# Patient Record
Sex: FEMALE | Race: BLACK OR AFRICAN AMERICAN | NOT HISPANIC OR LATINO | Employment: FULL TIME | URBAN - METROPOLITAN AREA
[De-identification: names, ages, dates, MRNs, and addresses within clinical notes are randomized per-mention and may not be internally consistent; named-entity substitution may affect disease eponyms.]

---

## 2020-09-28 ENCOUNTER — OFFICE VISIT (OUTPATIENT)
Dept: URGENT CARE | Facility: CLINIC | Age: 40
End: 2020-09-28
Payer: COMMERCIAL

## 2020-09-28 VITALS
WEIGHT: 215.06 LBS | BODY MASS INDEX: 30.11 KG/M2 | HEIGHT: 71 IN | DIASTOLIC BLOOD PRESSURE: 84 MMHG | HEART RATE: 85 BPM | SYSTOLIC BLOOD PRESSURE: 133 MMHG | TEMPERATURE: 98 F | OXYGEN SATURATION: 100 %

## 2020-09-28 DIAGNOSIS — F41.0 PANIC ATTACK DUE TO EXCEPTIONAL STRESS: Primary | ICD-10-CM

## 2020-09-28 DIAGNOSIS — F43.0 PANIC ATTACK DUE TO EXCEPTIONAL STRESS: Primary | ICD-10-CM

## 2020-09-28 PROCEDURE — 99203 PR OFFICE/OUTPT VISIT, NEW, LEVL III, 30-44 MIN: ICD-10-PCS | Mod: S$GLB,,, | Performed by: INTERNAL MEDICINE

## 2020-09-28 PROCEDURE — 99203 OFFICE O/P NEW LOW 30 MIN: CPT | Mod: S$GLB,,, | Performed by: INTERNAL MEDICINE

## 2020-09-28 RX ORDER — ALPRAZOLAM 0.5 MG/1
0.5 TABLET ORAL 2 TIMES DAILY PRN
COMMUNITY
End: 2020-09-28

## 2020-09-28 RX ORDER — ALPRAZOLAM 0.5 MG/1
0.5 TABLET ORAL 2 TIMES DAILY PRN
Qty: 10 TABLET | Refills: 0 | Status: SHIPPED | OUTPATIENT
Start: 2020-09-28 | End: 2020-10-28

## 2020-09-28 RX ORDER — ZOLPIDEM TARTRATE 12.5 MG/1
12.5 TABLET, FILM COATED, EXTENDED RELEASE ORAL NIGHTLY PRN
COMMUNITY

## 2020-09-28 NOTE — PROGRESS NOTES
CERTIFICATE OF SCHOOL      December 18, 2019      Re: Koffi Mayen  [id number]   [place] [address]      This is to certify that Koffi Mayen has been under my care from 12/16/2019 and can return to school.    RESTRICTIONS: none       REMARKS: eye exam at 2:20 12/16/2019        SIGNATURE:___________________________________________          Puneet Katz OD  [facility] [address] [facility] [id number]  Dept Phone: [phone]     "Subjective:       Patient ID: Lit Price is a 40 y.o. female.    Vitals:  height is 5' 11" (1.803 m) and weight is 97.6 kg (215 lb 0.9 oz). Her temperature is 98.3 °F (36.8 °C). Her blood pressure is 133/84 and her pulse is 85. Her oxygen saturation is 100%.     Chief Complaint: Anxiety    Anxiety  Presents for initial visit. Onset was in the past 7 days (Saturday). The problem has been gradually worsening. Symptoms include decreased concentration, excessive worry, hyperventilation, insomnia, irritability, malaise, muscle tension, nervous/anxious behavior, panic and restlessness. Patient reports no chest pain, compulsions, confusion, depressed mood, dizziness, dry mouth, feeling of choking, impotence, nausea, obsessions, palpitations, shortness of breath or suicidal ideas. Symptoms occur constantly. The severity of symptoms is causing significant distress. The symptoms are aggravated by work stress. The patient sleeps 4 hours per night. The quality of sleep is poor. Nighttime awakenings: one to two.     Risk factors include prior traumatic experience. Her past medical history is significant for anemia and anxiety/panic attacks. There is no history of arrhythmia, asthma, bipolar disorder, CAD, CHF, chronic lung disease, depression, fibromyalgia, hyperthyroidism or suicide attempts. Past treatments include benzodiazephines and SSRIs. The treatment provided moderate relief. Compliance with prior treatments has been good.       Constitution: Negative for chills, fatigue and fever.   HENT: Negative for congestion and sore throat.    Neck: Negative for painful lymph nodes.   Cardiovascular: Negative for chest pain, leg swelling and palpitations.   Eyes: Negative for double vision and blurred vision.   Respiratory: Positive for chest tightness. Negative for cough and shortness of breath.    Gastrointestinal: Negative for nausea, vomiting and diarrhea.   Genitourinary: Negative for dysuria, frequency, urgency and history " of kidney stones.   Musculoskeletal: Negative for joint pain, joint swelling, muscle cramps and muscle ache.   Skin: Negative for color change, pale, rash, erythema and bruising.   Allergic/Immunologic: Negative for seasonal allergies.   Neurological: Negative for dizziness, history of vertigo, light-headedness, passing out and headaches.   Hematologic/Lymphatic: Negative for swollen lymph nodes.   Psychiatric/Behavioral: Positive for agitation, nervous/anxious and sleep disturbance. Negative for confusion, suicidal ideas and depression. The patient is nervous/anxious and has insomnia.        Objective:      Physical Exam   Constitutional: She is oriented to person, place, and time. She appears well-developed. No distress.   HENT:   Head: Normocephalic and atraumatic.   Ears:   Right Ear: External ear normal.   Left Ear: External ear normal.   Nose: Nose normal.   Mouth/Throat: Oropharynx is clear and moist. No oropharyngeal exudate.   Eyes: Pupils are equal, round, and reactive to light. Conjunctivae and EOM are normal. Right eye exhibits no discharge. Left eye exhibits no discharge. No scleral icterus.   Neck: Normal range of motion. Neck supple.   Cardiovascular: Normal rate.   Pulmonary/Chest: Effort normal. No respiratory distress.   Lymphadenopathy:     She has no cervical adenopathy.   Neurological: She is alert and oriented to person, place, and time.   Skin: Skin is warm, dry, not diaphoretic and no rash. Capillary refill takes less than 2 seconds. erythemaPsychiatric: Her behavior is normal.   Nursing note and vitals reviewed.        Assessment:       1. Panic attack due to exceptional stress        Plan:         Panic attack due to exceptional stress  -     ALPRAZolam (XANAX) 0.5 MG tablet; Take 1 tablet (0.5 mg total) by mouth 2 (two) times daily as needed for Anxiety.  Dispense: 10 tablet; Refill: 0    Refilled 5 days of Xanax, encouraged patient to follow up with PCP for any further needs. No  physiologic evidence of withdrawal.

## 2020-09-28 NOTE — PATIENT INSTRUCTIONS
Treating Anxiety Disorders with Medicine  An anxiety disorder can make you feel nervous or apprehensive, even without a clear reason. In people age 65 and older, generalized anxiety disorder is one of the most commonly diagnosed anxiety disorders. Many times it occurs with depression. Certain anxiety disorders can cause intense feelings of fear or panic. You may even have physical symptoms such as a racing heartbeat, sweating, or dizziness. If you have these feelings, you dont have to suffer anymore. Treatment to help you overcome your fears will likely include therapy (also called counseling). Medicine may also be prescribed to help control your symptoms.    Medicines  Certain medicines may be prescribed to help control your symptoms. So you may feel less anxious. You may also feel able to move forward with therapy. At first, medicines and dosages may need to be adjusted to find what works best for you. Try to be patient. Tell your healthcare provider how a medicine makes you feel. This way, you can work together to find the treatment thats best for you. Keep in mind that medicines can have side effects. Talk with your provider about any side effects that are bothering you. Changing the dose or type of medicine may help. Dont stop taking medicine on your own. That can cause symptoms to come back.  · Anti-anxiety medicine. This medicine eases symptoms and helps you relax. Your healthcare provider will explain when and how to use it. It may be prescribed for use before situations that make you anxious. You may also be told to take medicine on a regular schedule. Anti-anxiety medicine may make you feel a little sleepy or out of it. Dont drive a car or operate machinery while on this medicine, until you know how it affects you.  Caution  Never use alcohol or other drugs with anti-anxiety medicines. This could result in loss of muscular control, sedation, coma, or death. Also, use only the amount of medicine  prescribed for you. If you think you may have taken too much, get emergency care right away.   · Antidepressant medicine. This kind of medicine is often used to treat anxiety, even if you arent depressed. An antidepressant helps balance out brain chemicals. This helps keep anxiety under control. This medicine is taken on a schedule. It takes a few weeks to start working. If you dont notice a change at first, you may just need more time. But if you dont notice results after the first few weeks, tell your provider.  Keep taking medicines as prescribed  Never change your dosage, share or use another person's medicine, or stop taking your medicines without talking to your healthcare provider first. Keep the following in mind:  · Some medicines must be taken on a schedule. Make this part of your daily routine. For instance, always take your pill before brushing your teeth. A pillbox can help you remember if youve taken your medicine each day.  · Medicines are often taken for 6 to 12 months. Your healthcare provider will then evaluate whether you need to stay on them. Many people who have also had therapy may no longer need medicine to manage anxiety.  · You may need to stop taking medicine slowly to give your body time to adjust. When its time to stop, your healthcare provider will tell you more. Remember: Never stop taking your medicine without talking to your provider first.  · If symptoms return, you may need to start taking medicines again. This isnt your fault. Its just the nature of your anxiety disorder.  Special concerns  · Side effects. Medicines may cause side effects. Ask your healthcare provider or pharmacist what you can expect. They may have ideas for avoiding some side effects.  · Sexual problems. Some antidepressants can affect your desire for sex or your ability to have an orgasm. A change in dosage or medicine often solves the problem. If you have a sexual side effect that concerns you, tell your  healthcare provider.  · Addiction. If youve never had a problem with drugs or alcohol, you may not have a problem with medicines used to treat anxiety disorders. But always discuss the medicines with your healthcare provider before taking them. If you have a history of addiction, you may not be able to use certain medicines used to treat anxiety disorders.  · Medicine interactions. Always check with your pharmacist before using any over-the-counter medicines, including herbal supplements.   Date Last Reviewed: 5/1/2017 © 2000-2017 [employer]. [address] [place] [address], [place] [address]. All rights reserved. This information is not intended as a substitute for professional medical care. Always follow your healthcare professional's instructions.

## 2020-12-22 ENCOUNTER — CLINICAL SUPPORT (OUTPATIENT)
Dept: URGENT CARE | Facility: CLINIC | Age: 40
End: 2020-12-22
Payer: COMMERCIAL

## 2020-12-22 DIAGNOSIS — Z03.818 ENCOUNTER FOR OBSERVATION FOR SUSPECTED EXPOSURE TO OTHER BIOLOGICAL AGENTS RULED OUT: Primary | ICD-10-CM

## 2020-12-22 LAB
CTP QC/QA: YES
SARS-COV-2 RDRP RESP QL NAA+PROBE: NEGATIVE

## 2020-12-22 PROCEDURE — U0002 COVID-19 LAB TEST NON-CDC: HCPCS | Mod: QW,S$GLB,, | Performed by: NURSE PRACTITIONER

## 2020-12-22 PROCEDURE — 99211 PR OFFICE/OUTPT VISIT, EST, LEVL I: ICD-10-PCS | Mod: S$GLB,,, | Performed by: NURSE PRACTITIONER

## 2020-12-22 PROCEDURE — 99211 OFF/OP EST MAY X REQ PHY/QHP: CPT | Mod: S$GLB,,, | Performed by: NURSE PRACTITIONER

## 2020-12-22 PROCEDURE — U0002: ICD-10-PCS | Mod: QW,S$GLB,, | Performed by: NURSE PRACTITIONER

## 2020-12-22 NOTE — PATIENT INSTRUCTIONS
"  You have tested negative for COVID-19 today.  If you did not have a close exposure (as defined below) you can return to your normal daily activities to include social distancing, wearing a mask and frequent handwashing.    A "close exposure" is defined as anyone who has had an exposure (masked or unmasked) to a known COVID -19 positive person within 6 ft for longer than 15 minutes. If your exposure meets this definition, you are required by CDC guidelines to quarantine for at least 7-10 days from time of exposure.    The CDC states that a test can be performed for an asymptomatic patient (someone who does not have any symptoms) after a close exposure, and that a test should be done if you develop symptoms after a close exposure as described above.    Specifically, you can test at day 5 or later if asymptomatic in order to get released from quarantine on day 7 or later.  If you develop symptoms sooner, you should test when your symptoms start.    If you developed symptoms since the exposure, and your test was negative today and less than 5 days from your exposure, you still have to quarantine for 7-10 days from the date of the exposure.  The 7-10 day quarantine begins from the day you were exposed, not the day of your test.  For example, if your exposure was on a Monday, and you waited until Friday of the same week to get tested and it was negative, your 7-10 day quarantine begins from that Monday, not the Friday you tested negative.    Please note, if you decide to test as an asymptomatic during your quarantine and you are positive, you will be restarting your quarantine and moving from a possible 10 day quarantine (if you do not test), to a 11 day or greater quarantine.    "

## 2022-07-14 ENCOUNTER — OFFICE VISIT (OUTPATIENT)
Dept: URGENT CARE | Facility: CLINIC | Age: 42
End: 2022-07-14
Payer: COMMERCIAL

## 2022-07-14 VITALS
SYSTOLIC BLOOD PRESSURE: 153 MMHG | HEIGHT: 71 IN | HEART RATE: 86 BPM | BODY MASS INDEX: 30.8 KG/M2 | TEMPERATURE: 99 F | OXYGEN SATURATION: 100 % | DIASTOLIC BLOOD PRESSURE: 92 MMHG | WEIGHT: 220 LBS | RESPIRATION RATE: 16 BRPM

## 2022-07-14 DIAGNOSIS — T78.40XA ALLERGIC REACTION, INITIAL ENCOUNTER: ICD-10-CM

## 2022-07-14 DIAGNOSIS — J06.9 URI WITH COUGH AND CONGESTION: Primary | ICD-10-CM

## 2022-07-14 LAB
CTP QC/QA: YES
CTP QC/QA: YES
MOLECULAR STREP A: NEGATIVE
SARS-COV-2 RDRP RESP QL NAA+PROBE: NEGATIVE

## 2022-07-14 PROCEDURE — 87651 STREP A DNA AMP PROBE: CPT | Mod: QW,S$GLB,, | Performed by: PHYSICIAN ASSISTANT

## 2022-07-14 PROCEDURE — U0002 COVID-19 LAB TEST NON-CDC: HCPCS | Mod: QW,S$GLB,, | Performed by: PHYSICIAN ASSISTANT

## 2022-07-14 PROCEDURE — 99214 PR OFFICE/OUTPT VISIT, EST, LEVL IV, 30-39 MIN: ICD-10-PCS | Mod: S$GLB,,, | Performed by: PHYSICIAN ASSISTANT

## 2022-07-14 PROCEDURE — 99214 OFFICE O/P EST MOD 30 MIN: CPT | Mod: S$GLB,,, | Performed by: PHYSICIAN ASSISTANT

## 2022-07-14 PROCEDURE — U0002: ICD-10-PCS | Mod: QW,S$GLB,, | Performed by: PHYSICIAN ASSISTANT

## 2022-07-14 PROCEDURE — 87651 POCT STREP A MOLECULAR: ICD-10-PCS | Mod: QW,S$GLB,, | Performed by: PHYSICIAN ASSISTANT

## 2022-07-14 RX ORDER — PROMETHAZINE HYDROCHLORIDE AND DEXTROMETHORPHAN HYDROBROMIDE 6.25; 15 MG/5ML; MG/5ML
5 SYRUP ORAL EVERY 6 HOURS PRN
Qty: 140 ML | Refills: 0 | Status: SHIPPED | OUTPATIENT
Start: 2022-07-14 | End: 2022-07-21

## 2022-07-14 RX ORDER — FLUTICASONE PROPIONATE 50 MCG
2 SPRAY, SUSPENSION (ML) NASAL DAILY
Qty: 9.9 ML | Refills: 0 | Status: SHIPPED | OUTPATIENT
Start: 2022-07-14 | End: 2022-07-21

## 2022-07-14 RX ORDER — METHYLPREDNISOLONE 4 MG/1
TABLET ORAL
Qty: 1 EACH | Refills: 0 | Status: SHIPPED | OUTPATIENT
Start: 2022-07-14

## 2022-07-14 NOTE — PATIENT INSTRUCTIONS
Take promethazine dm syrup for cough - may cause drowsiness. Sterile saline rinses and flonase to decongest. Tylenol or motrin for pain/fever. May try sudafed for any severe congestion. Rest and drink plenty of fluids.     May try medrol dose quin to help with congestion and possible lip swelling. Discontinue if blood pressure consistently > 160/90, sugar > 250 or severe side effects - may cause anxiety, insomnia, palpitations and fluid retention.      Follow up with your doctor for any persistent new fever, or persistent sinus pressure/congestion not improving > 10 days.You need to be seen urgently if you develop any chest pain or shortness of breath.     Lip swelling - monitor for any swelling of tongue, throat, shortness of breath, vomiting or severe hives - needs emergent evaluation. You may try benadryl every 4-6 hours if persists.

## 2022-07-14 NOTE — PROGRESS NOTES
"Subjective:       Patient ID: Lit Price is a 42 y.o. female.    Vitals:  height is 5' 11" (1.803 m) and weight is 99.8 kg (220 lb). Her temperature is 98.6 °F (37 °C). Her blood pressure is 153/92 (abnormal) and her pulse is 86. Her respiration is 16 and oxygen saturation is 100%.     Chief Complaint: Diarrhea    Pt c/o URI symptoms, sore throat, nasal congestion, chest congestion, sinus pressure, wet cough, lip swelling starting last Saturday - 5 days ago.  Pt states noticed lip swelling yesterday. No SOB, swelling of tongue or throat, NV or hives. Not on any home meds, unsure if she could have had a possible allergic rxn to some type of food. Hx of ultram allergy only.  Pt took at home covid test (Saturday/Tuesday) both negative. Pt also states occasional diarrhea.     Cough  This is a new problem. The current episode started in the past 7 days. The problem has been unchanged. The problem occurs every few minutes. The cough is productive of sputum (Wet cough). Associated symptoms include ear pain (pressure), nasal congestion, postnasal drip, a sore throat and sweats (night). Pertinent negatives include no chest pain, chills, ear congestion, eye redness, fever, headaches, heartburn, hemoptysis, myalgias, rash, rhinorrhea, shortness of breath, weight loss or wheezing. Nothing aggravates the symptoms. Treatments tried: zyrtec, mucinex DM. The treatment provided no relief. Her past medical history is significant for bronchitis. There is no history of asthma, bronchiectasis, COPD, emphysema, environmental allergies or pneumonia.       Constitution: Negative for chills and fever.   HENT: Positive for ear pain (pressure), congestion, postnasal drip and sore throat.    Neck: Negative for neck pain and neck stiffness.   Cardiovascular: Negative for chest pain, leg swelling, palpitations and sob on exertion.   Eyes: Negative for eye discharge, eye itching, eye pain and eye redness.   Respiratory: Positive for cough and " sputum production. Negative for bloody sputum, shortness of breath and wheezing.    Gastrointestinal: Positive for diarrhea. Negative for abdominal pain, nausea, vomiting and heartburn.   Genitourinary: Negative for dysuria.   Musculoskeletal: Negative for muscle ache.   Skin: Negative for rash and wound.   Allergic/Immunologic: Negative for environmental allergies.   Neurological: Negative for headaches.       Objective:      Physical Exam   Constitutional: She is oriented to person, place, and time. She appears well-developed. She is cooperative.  Non-toxic appearance. She does not appear ill. No distress.   HENT:   Head: Normocephalic and atraumatic.   Ears:   Right Ear: Hearing, tympanic membrane, external ear and ear canal normal.   Left Ear: Hearing, tympanic membrane, external ear and ear canal normal.   Nose: Mucosal edema, rhinorrhea and congestion present. No nasal deformity. No epistaxis. Right sinus exhibits no maxillary sinus tenderness and no frontal sinus tenderness. Left sinus exhibits no maxillary sinus tenderness and no frontal sinus tenderness.   Mouth/Throat: Uvula is midline and mucous membranes are normal. No trismus in the jaw. Normal dentition. No uvula swelling. Posterior oropharyngeal erythema and cobblestoning present. No oropharyngeal exudate or posterior oropharyngeal edema. Tonsils are 2+ on the right. Tonsils are 2+ on the left. No tonsillar exudate.   Mild upper and lower lip swelling. No intraoral or pharyngeal angioedema. No stridor. No hives      Comments: Mild upper and lower lip swelling. No intraoral or pharyngeal angioedema. No stridor. No hives  Eyes: Conjunctivae and lids are normal. No scleral icterus.   Neck: Trachea normal and phonation normal. Neck supple. No edema present. No erythema present. No neck rigidity present.   Cardiovascular: Normal rate, regular rhythm, normal heart sounds and normal pulses.   Pulmonary/Chest: Effort normal and breath sounds normal. No  respiratory distress. She has no decreased breath sounds. She has no wheezes. She has no rhonchi. She has no rales.   Abdominal: Normal appearance. There is no abdominal tenderness.   Musculoskeletal: Normal range of motion.         General: No deformity. Normal range of motion.   Neurological: She is alert and oriented to person, place, and time. She exhibits normal muscle tone. Coordination normal.   Skin: Skin is warm, dry, intact, not diaphoretic and not pale.   Psychiatric: Her speech is normal and behavior is normal. Judgment and thought content normal.   Nursing note and vitals reviewed.        Results for orders placed or performed in visit on 07/14/22   POCT COVID-19 Rapid Screening   Result Value Ref Range    POC Rapid COVID Negative Negative     Acceptable Yes    POCT Strep A, Molecular   Result Value Ref Range    Molecular Strep A, POC Negative Negative     Acceptable Yes        Assessment:       1. URI with cough and congestion    2. Allergic reaction, initial encounter          Plan:         URI with cough and congestion  -     POCT COVID-19 Rapid Screening  -     methylPREDNISolone (MEDROL DOSEPACK) 4 mg tablet; use as directed  Dispense: 1 each; Refill: 0  -     promethazine-dextromethorphan (PROMETHAZINE-DM) 6.25-15 mg/5 mL Syrp; Take 5 mLs by mouth every 6 (six) hours as needed.  Dispense: 140 mL; Refill: 0  -     POCT Strep A, Molecular  -     fluticasone propionate (FLONASE) 50 mcg/actuation nasal spray; 2 sprays (100 mcg total) by Each Nostril route once daily. Dispense 1 bottle for 7 days  Dispense: 9.9 mL; Refill: 0    Allergic reaction, initial encounter  -     methylPREDNISolone (MEDROL DOSEPACK) 4 mg tablet; use as directed  Dispense: 1 each; Refill: 0    Nevaeh Sam PA-C  [facility]         Patient Instructions   Take promethazine dm syrup for cough - may cause drowsiness. Sterile saline rinses and flonase to decongest. Tylenol or motrin  for pain/fever. May try sudafed for any severe congestion. Rest and drink plenty of fluids.     May try medrol dose quin to help with congestion and possible lip swelling. Discontinue if blood pressure consistently > 160/90, sugar > 250 or severe side effects - may cause anxiety, insomnia, palpitations and fluid retention.      Follow up with your doctor for any persistent new fever, or persistent sinus pressure/congestion not improving > 10 days.You need to be seen urgently if you develop any chest pain or shortness of breath.     Lip swelling - monitor for any swelling of tongue, throat, shortness of breath, vomiting or severe hives - needs emergent evaluation. You may try benadryl every 4-6 hours if persists.

## 2022-07-17 ENCOUNTER — TELEPHONE (OUTPATIENT)
Dept: URGENT CARE | Facility: CLINIC | Age: 42
End: 2022-07-17
Payer: COMMERCIAL